# Patient Record
(demographics unavailable — no encounter records)

---

## 2024-10-15 NOTE — REASON FOR VISIT
[Initial] : initial visit for [ABR Evaluation] : auditory brain response evaluation [Parents] : parents

## 2024-10-15 NOTE — HISTORY OF PRESENT ILLNESS
[FreeTextEntry1] : 2 month male patient seen today for initial Auditory Brainstem Response (ABR) evaluation. Patient referred for ABR after failing NBHS in one ear prior to discharge from [facility]-mother is unsure of which ear and [facility] did not have record. No family history of hearing loss. Patient born at 39.4 weeks via . NICU stay denied.

## 2024-10-15 NOTE — ASSESSMENT
[FreeTextEntry1] : ABR results consistent with estimated hearing thresholds within normal limits at 2000Hz and 4000Hz, bilaterally. Click stimulus presented at 65dB nHL in the right and left ears at rarefaction and condensation polarities is consistent with a normal click study. Patient awoke, preventing further testing. Updated [facility].

## 2024-10-15 NOTE — PROCEDURE
[] : Auditory Brainstem Response: [___dBnHL] : 4000 Hz: [unfilled] dBnHL [Threshold] : threshold [Natural Sleep] : natural sleep [Clear Wavefoms] : clear waveforms  [ABR responses to ___/sec] : responses to [unfilled] /sec [de-identified] : ABR is not a true test of hearing; it is an objective test that measures brainstem activity in response to acoustic stimuli. ABR evaluates the integrity of the hearing system from the level of the cochlea up through the lower brainstem. From this, we are able to gather data to estimate hearing thresholds. Please note thresholds are reported in dBnHL. Diagnostic statement includes a correction factor of -20 dB at 500Hz,-15 dB at 1000Hz, -10dB at 2000Hz, and -5dB at 4000Hz.